# Patient Record
Sex: MALE | Race: WHITE
[De-identification: names, ages, dates, MRNs, and addresses within clinical notes are randomized per-mention and may not be internally consistent; named-entity substitution may affect disease eponyms.]

---

## 2018-01-19 ENCOUNTER — APPOINTMENT (OUTPATIENT)
Dept: FAMILY MEDICINE | Facility: CLINIC | Age: 48
End: 2018-01-19
Payer: COMMERCIAL

## 2018-01-19 VITALS
SYSTOLIC BLOOD PRESSURE: 144 MMHG | RESPIRATION RATE: 13 BRPM | DIASTOLIC BLOOD PRESSURE: 94 MMHG | HEART RATE: 79 BPM | OXYGEN SATURATION: 97 %

## 2018-01-19 DIAGNOSIS — Z83.6 FAMILY HISTORY OF OTHER DISEASES OF THE RESPIRATORY SYSTEM: ICD-10-CM

## 2018-01-19 DIAGNOSIS — Z82.49 FAMILY HISTORY OF ISCHEMIC HEART DISEASE AND OTHER DISEASES OF THE CIRCULATORY SYSTEM: ICD-10-CM

## 2018-01-19 DIAGNOSIS — Z80.3 FAMILY HISTORY OF MALIGNANT NEOPLASM OF BREAST: ICD-10-CM

## 2018-01-19 DIAGNOSIS — Z87.891 PERSONAL HISTORY OF NICOTINE DEPENDENCE: ICD-10-CM

## 2018-01-19 DIAGNOSIS — Z86.69 PERSONAL HISTORY OF OTHER DISEASES OF THE NERVOUS SYSTEM AND SENSE ORGANS: ICD-10-CM

## 2018-01-19 DIAGNOSIS — L40.9 PSORIASIS, UNSPECIFIED: ICD-10-CM

## 2018-01-19 DIAGNOSIS — Z78.9 OTHER SPECIFIED HEALTH STATUS: ICD-10-CM

## 2018-01-19 PROCEDURE — 99386 PREV VISIT NEW AGE 40-64: CPT

## 2018-01-19 RX ORDER — LOTEPREDNOL ETABONATE 5 MG/G
0.5 GEL OPHTHALMIC
Qty: 5 | Refills: 0 | Status: COMPLETED | COMMUNITY
Start: 2017-10-20

## 2018-01-19 RX ORDER — BLOOD-GLUCOSE METER
KIT MISCELLANEOUS
Qty: 1 | Refills: 0 | Status: ACTIVE | COMMUNITY
Start: 2018-01-19 | End: 1900-01-01

## 2018-01-19 RX ORDER — NEOMYCIN AND POLYMYXIN B SULFATES AND DEXAMETHASONE 3.5; 10000; 1 MG/G; [IU]/G; MG/G
3.5-10000-0.1 OINTMENT OPHTHALMIC
Qty: 4 | Refills: 0 | Status: COMPLETED | COMMUNITY
Start: 2017-09-14

## 2018-01-19 RX ORDER — CLOBETASOL PROPIONATE 0.5 MG/G
0.05 AEROSOL, FOAM TOPICAL
Qty: 50 | Refills: 0 | Status: COMPLETED | COMMUNITY
Start: 2017-09-26

## 2018-01-19 RX ORDER — CLOBETASOL PROPIONATE 0.05 G/ML
0.05 SPRAY TOPICAL
Qty: 125 | Refills: 0 | Status: ACTIVE | COMMUNITY
Start: 2017-10-21

## 2018-01-19 RX ORDER — DOXYCYCLINE HYCLATE 50 MG/1
50 CAPSULE ORAL
Qty: 60 | Refills: 0 | Status: COMPLETED | COMMUNITY
Start: 2017-10-20

## 2018-11-08 ENCOUNTER — APPOINTMENT (OUTPATIENT)
Dept: FAMILY MEDICINE | Facility: CLINIC | Age: 48
End: 2018-11-08
Payer: COMMERCIAL

## 2018-11-08 VITALS
HEIGHT: 70 IN | DIASTOLIC BLOOD PRESSURE: 89 MMHG | HEART RATE: 65 BPM | OXYGEN SATURATION: 98 % | TEMPERATURE: 98 F | SYSTOLIC BLOOD PRESSURE: 131 MMHG

## 2018-11-08 DIAGNOSIS — I10 ESSENTIAL (PRIMARY) HYPERTENSION: ICD-10-CM

## 2018-11-08 DIAGNOSIS — Z00.00 ENCOUNTER FOR GENERAL ADULT MEDICAL EXAMINATION W/OUT ABNORMAL FINDINGS: ICD-10-CM

## 2018-11-08 PROCEDURE — G0008: CPT

## 2018-11-08 PROCEDURE — 90686 IIV4 VACC NO PRSV 0.5 ML IM: CPT

## 2018-11-08 PROCEDURE — 99396 PREV VISIT EST AGE 40-64: CPT | Mod: 25

## 2018-11-08 RX ORDER — CHLORTHALIDONE 25 MG/1
25 TABLET ORAL
Qty: 30 | Refills: 0 | Status: DISCONTINUED | COMMUNITY
Start: 2018-01-14 | End: 2018-08-08

## 2018-11-08 NOTE — HISTORY OF PRESENT ILLNESS
[FreeTextEntry1] : Physical exam [de-identified] : 49yo M, PMH/o HTN, recurrent blepharitis, and psoriasis, presenting for a physical exam and for blood pressure medication refill. Patient ran out of medication about 3 months ago and instead has been managing his HTN with exercise and diet, has since lost 15 lbs over the last weeks. Denies fevers, headaches, dizziness, chest pain, palpitations, SOB, abdominal pain, N/V/D/C.

## 2018-11-08 NOTE — PHYSICAL EXAM

## 2018-11-12 LAB
ALBUMIN SERPL ELPH-MCNC: 4.5 G/DL
ALP BLD-CCNC: 65 U/L
ALT SERPL-CCNC: 38 U/L
ANION GAP SERPL CALC-SCNC: 11 MMOL/L
AST SERPL-CCNC: 24 U/L
BASOPHILS # BLD AUTO: 0.01 K/UL
BASOPHILS NFR BLD AUTO: 0.2 %
BILIRUB SERPL-MCNC: 0.7 MG/DL
BUN SERPL-MCNC: 14 MG/DL
CALCIUM SERPL-MCNC: 9.6 MG/DL
CHLORIDE SERPL-SCNC: 106 MMOL/L
CHOLEST SERPL-MCNC: 115 MG/DL
CHOLEST/HDLC SERPL: 2.8 RATIO
CO2 SERPL-SCNC: 27 MMOL/L
CREAT SERPL-MCNC: 1.05 MG/DL
EOSINOPHIL # BLD AUTO: 0.06 K/UL
EOSINOPHIL NFR BLD AUTO: 1.4 %
GLUCOSE SERPL-MCNC: 98 MG/DL
HCT VFR BLD CALC: 40.4 %
HDLC SERPL-MCNC: 41 MG/DL
HGB BLD-MCNC: 13.7 G/DL
IMM GRANULOCYTES NFR BLD AUTO: 0 %
LDLC SERPL CALC-MCNC: 59 MG/DL
LYMPHOCYTES # BLD AUTO: 1.01 K/UL
LYMPHOCYTES NFR BLD AUTO: 23.5 %
MAN DIFF?: NORMAL
MCHC RBC-ENTMCNC: 28.8 PG
MCHC RBC-ENTMCNC: 33.9 GM/DL
MCV RBC AUTO: 85.1 FL
MONOCYTES # BLD AUTO: 0.36 K/UL
MONOCYTES NFR BLD AUTO: 8.4 %
NEUTROPHILS # BLD AUTO: 2.86 K/UL
NEUTROPHILS NFR BLD AUTO: 66.5 %
PLATELET # BLD AUTO: 185 K/UL
POTASSIUM SERPL-SCNC: 5.1 MMOL/L
PROT SERPL-MCNC: 6.7 G/DL
RBC # BLD: 4.75 M/UL
RBC # FLD: 12.7 %
SODIUM SERPL-SCNC: 144 MMOL/L
TRIGL SERPL-MCNC: 77 MG/DL
TSH SERPL-ACNC: 2.94 UIU/ML
WBC # FLD AUTO: 4.3 K/UL

## 2018-11-15 ENCOUNTER — TRANSCRIPTION ENCOUNTER (OUTPATIENT)
Age: 48
End: 2018-11-15